# Patient Record
Sex: FEMALE | Race: BLACK OR AFRICAN AMERICAN | NOT HISPANIC OR LATINO | URBAN - METROPOLITAN AREA
[De-identification: names, ages, dates, MRNs, and addresses within clinical notes are randomized per-mention and may not be internally consistent; named-entity substitution may affect disease eponyms.]

---

## 2021-11-24 ENCOUNTER — OUTPATIENT (OUTPATIENT)
Dept: OUTPATIENT SERVICES | Facility: HOSPITAL | Age: 31
LOS: 1 days | End: 2021-11-24
Payer: COMMERCIAL

## 2021-11-24 VITALS
SYSTOLIC BLOOD PRESSURE: 107 MMHG | DIASTOLIC BLOOD PRESSURE: 62 MMHG | RESPIRATION RATE: 16 BRPM | HEIGHT: 66 IN | WEIGHT: 160.06 LBS | TEMPERATURE: 97 F | HEART RATE: 70 BPM | OXYGEN SATURATION: 97 %

## 2021-11-24 DIAGNOSIS — D25.9 LEIOMYOMA OF UTERUS, UNSPECIFIED: ICD-10-CM

## 2021-11-24 DIAGNOSIS — N63.0 UNSPECIFIED LUMP IN UNSPECIFIED BREAST: Chronic | ICD-10-CM

## 2021-11-24 DIAGNOSIS — N83.201 UNSPECIFIED OVARIAN CYST, RIGHT SIDE: ICD-10-CM

## 2021-11-24 DIAGNOSIS — Z90.721 ACQUIRED ABSENCE OF OVARIES, UNILATERAL: Chronic | ICD-10-CM

## 2021-11-24 DIAGNOSIS — N75.0 CYST OF BARTHOLIN'S GLAND: Chronic | ICD-10-CM

## 2021-11-24 DIAGNOSIS — Z01.818 ENCOUNTER FOR OTHER PREPROCEDURAL EXAMINATION: ICD-10-CM

## 2021-11-24 LAB
ANION GAP SERPL CALC-SCNC: 4 MMOL/L — LOW (ref 5–17)
BUN SERPL-MCNC: 16 MG/DL — SIGNIFICANT CHANGE UP (ref 7–23)
CALCIUM SERPL-MCNC: 8.7 MG/DL — SIGNIFICANT CHANGE UP (ref 8.5–10.1)
CHLORIDE SERPL-SCNC: 109 MMOL/L — HIGH (ref 96–108)
CO2 SERPL-SCNC: 27 MMOL/L — SIGNIFICANT CHANGE UP (ref 22–31)
CREAT SERPL-MCNC: 0.82 MG/DL — SIGNIFICANT CHANGE UP (ref 0.5–1.3)
GLUCOSE SERPL-MCNC: 121 MG/DL — HIGH (ref 70–99)
HCG SERPL-ACNC: <1 MIU/ML — SIGNIFICANT CHANGE UP
HCT VFR BLD CALC: 37.5 % — SIGNIFICANT CHANGE UP (ref 34.5–45)
HGB BLD-MCNC: 12.3 G/DL — SIGNIFICANT CHANGE UP (ref 11.5–15.5)
MCHC RBC-ENTMCNC: 22.2 PG — LOW (ref 27–34)
MCHC RBC-ENTMCNC: 32.8 GM/DL — SIGNIFICANT CHANGE UP (ref 32–36)
MCV RBC AUTO: 67.7 FL — LOW (ref 80–100)
NRBC # BLD: 0 /100 WBCS — SIGNIFICANT CHANGE UP (ref 0–0)
PLATELET # BLD AUTO: 258 K/UL — SIGNIFICANT CHANGE UP (ref 150–400)
POTASSIUM SERPL-MCNC: 3.9 MMOL/L — SIGNIFICANT CHANGE UP (ref 3.5–5.3)
POTASSIUM SERPL-SCNC: 3.9 MMOL/L — SIGNIFICANT CHANGE UP (ref 3.5–5.3)
RBC # BLD: 5.54 M/UL — HIGH (ref 3.8–5.2)
RBC # FLD: 14.9 % — HIGH (ref 10.3–14.5)
SODIUM SERPL-SCNC: 140 MMOL/L — SIGNIFICANT CHANGE UP (ref 135–145)
WBC # BLD: 3.08 K/UL — LOW (ref 3.8–10.5)
WBC # FLD AUTO: 3.08 K/UL — LOW (ref 3.8–10.5)

## 2021-11-24 PROCEDURE — G0463: CPT

## 2021-11-24 PROCEDURE — 84702 CHORIONIC GONADOTROPIN TEST: CPT

## 2021-11-24 PROCEDURE — 86900 BLOOD TYPING SEROLOGIC ABO: CPT

## 2021-11-24 PROCEDURE — 86901 BLOOD TYPING SEROLOGIC RH(D): CPT

## 2021-11-24 PROCEDURE — 86850 RBC ANTIBODY SCREEN: CPT

## 2021-11-24 PROCEDURE — 36415 COLL VENOUS BLD VENIPUNCTURE: CPT

## 2021-11-24 PROCEDURE — 80048 BASIC METABOLIC PNL TOTAL CA: CPT

## 2021-11-24 PROCEDURE — 85027 COMPLETE CBC AUTOMATED: CPT

## 2021-11-24 NOTE — H&P PST ADULT - NSANTHOSAYNRD_GEN_A_CORE
No. DINO screening performed.  STOP BANG Legend: 0-2 = LOW Risk; 3-4 = INTERMEDIATE Risk; 5-8 = HIGH Risk

## 2021-11-24 NOTE — H&P PST ADULT - ASSESSMENT
30 y/o female, scheduled fro D&C hysteroscopy, abdominal myomectomy  right ovarian cystectomy and possible  hysteroscopic resection of myoma on 12/8/21. Pre op testing today.   32 y/o female, scheduled fro D&C hysteroscopy, abdominal myomectomy  right ovarian cystectomy and possible  hysteroscopic resection of myoma on 12/8/21. Pre op testing today.  .     30 y/o female, scheduled fro D&C hysteroscopy, abdominal myomectomy  right ovarian cystectomy and possible  hysteroscopic resection of myoma on 12/8/21. Pre op testing today.

## 2021-11-24 NOTE — H&P PST ADULT - PROBLEM SELECTOR PLAN 1
scheduled fro D&C hysteroscopy, abdominal myomectomy  right ovarian cystectomy and possible  hysteroscopic resection of myoma on 12/8/21. Pre op testing today.  Pre op instructions:  Hold OTC supplements. Medications reviewed for the week and morning of surgery.  NPO after 11pm to the morning of surgery.   Vaccinated for covid: Pfizer   Shower 2 days before and the morning of surgery with antibacterial soap as instructed.  Covid testing 3 days prior to procedure, information given.  Patient verbalized understanding. scheduled fro D&C hysteroscopy, abdominal myomectomy  right ovarian cystectomy and possible  hysteroscopic resection of myoma on 12/8/21. Pre op testing today.  Pre op instructions:  Hold OTC supplements. Medications reviewed for the week and morning of surgery.  NPO after 11pm to the morning of surgery.   Vaccinated for covid: Pfizer   Shower 2 days before and the morning of surgery with antibacterial soap as instructed.  Covid testing 3 days prior to procedure, information given for Manuel, prescription given.  Patient verbalized understanding.

## 2021-11-24 NOTE — H&P PST ADULT - PROBLEM SELECTOR PLAN 2
scheduled fro D&C hysteroscopy, abdominal myomectomy  right ovarian cystectomy and possible  hysteroscopic resection of myoma on 12/8/21. Pre op testing today.    see above for instructions

## 2021-11-24 NOTE — H&P PST ADULT - HISTORY OF PRESENT ILLNESS
30 y/o healthy female with c/o of lower abdominal  pain since April 2021, Denies any irregular periods, however it is heavy. pain was during periods, now it is on and off for past couple of months. Seen by GYN Dr Medina, sonogram and MRI was done, diagnosed with uterine fibroid and right ovarian  cyst. Left ovary and cyst was removed in 2009. Now scheduled fro D&C hysteroscopy, abdominal myomectomy  right ovarian cystectomy and possible  hysteroscopic resection of myoma on 12/8/21. Pre op testing today.

## 2021-11-24 NOTE — H&P PST ADULT - NSICDXPASTSURGICALHX_GEN_ALL_CORE_FT
PAST SURGICAL HISTORY:  Bartholin cyst     Benign breast lumps right breast    History of left oophorectomy 2009

## 2021-11-24 NOTE — H&P PST ADULT - NSICDXFAMILYHX_GEN_ALL_CORE_FT
FAMILY HISTORY:  Mother  Still living? Yes, Estimated age: 51-60  FH: type 2 diabetes, Age at diagnosis: Age Unknown

## 2021-12-03 PROBLEM — H04.123 DRY EYE SYNDROME OF BILATERAL LACRIMAL GLANDS: Chronic | Status: ACTIVE | Noted: 2021-11-24

## 2021-12-03 PROBLEM — D57.3 SICKLE-CELL TRAIT: Chronic | Status: ACTIVE | Noted: 2021-11-24

## 2021-12-04 DIAGNOSIS — Z01.818 ENCOUNTER FOR OTHER PREPROCEDURAL EXAMINATION: ICD-10-CM

## 2021-12-04 PROBLEM — Z00.00 ENCOUNTER FOR PREVENTIVE HEALTH EXAMINATION: Status: ACTIVE | Noted: 2021-12-04

## 2021-12-05 ENCOUNTER — APPOINTMENT (OUTPATIENT)
Dept: DISASTER EMERGENCY | Facility: CLINIC | Age: 31
End: 2021-12-05

## 2021-12-06 LAB — SARS-COV-2 N GENE NPH QL NAA+PROBE: NOT DETECTED

## 2021-12-07 ENCOUNTER — TRANSCRIPTION ENCOUNTER (OUTPATIENT)
Age: 31
End: 2021-12-07

## 2021-12-08 ENCOUNTER — INPATIENT (INPATIENT)
Facility: HOSPITAL | Age: 31
LOS: 1 days | Discharge: HOME | DRG: 743 | End: 2021-12-10
Attending: OBSTETRICS & GYNECOLOGY | Admitting: OBSTETRICS & GYNECOLOGY
Payer: COMMERCIAL

## 2021-12-08 ENCOUNTER — RESULT REVIEW (OUTPATIENT)
Age: 31
End: 2021-12-08

## 2021-12-08 VITALS
HEART RATE: 93 BPM | DIASTOLIC BLOOD PRESSURE: 87 MMHG | TEMPERATURE: 98 F | WEIGHT: 160.06 LBS | RESPIRATION RATE: 18 BRPM | HEIGHT: 66 IN | SYSTOLIC BLOOD PRESSURE: 159 MMHG | OXYGEN SATURATION: 100 %

## 2021-12-08 DIAGNOSIS — N63.0 UNSPECIFIED LUMP IN UNSPECIFIED BREAST: Chronic | ICD-10-CM

## 2021-12-08 DIAGNOSIS — N83.201 UNSPECIFIED OVARIAN CYST, RIGHT SIDE: ICD-10-CM

## 2021-12-08 DIAGNOSIS — Z90.721 ACQUIRED ABSENCE OF OVARIES, UNILATERAL: Chronic | ICD-10-CM

## 2021-12-08 DIAGNOSIS — N75.0 CYST OF BARTHOLIN'S GLAND: Chronic | ICD-10-CM

## 2021-12-08 LAB — HCG UR QL: NEGATIVE — SIGNIFICANT CHANGE UP

## 2021-12-08 PROCEDURE — 88305 TISSUE EXAM BY PATHOLOGIST: CPT | Mod: 26

## 2021-12-08 RX ORDER — SODIUM CHLORIDE 9 MG/ML
1000 INJECTION, SOLUTION INTRAVENOUS
Refills: 0 | Status: DISCONTINUED | OUTPATIENT
Start: 2021-12-08 | End: 2021-12-08

## 2021-12-08 RX ORDER — ACETAMINOPHEN 500 MG
1000 TABLET ORAL ONCE
Refills: 0 | Status: COMPLETED | OUTPATIENT
Start: 2021-12-08 | End: 2021-12-08

## 2021-12-08 RX ORDER — ACETAMINOPHEN 500 MG
1000 TABLET ORAL ONCE
Refills: 0 | Status: COMPLETED | OUTPATIENT
Start: 2021-12-09 | End: 2021-12-09

## 2021-12-08 RX ORDER — HYDROMORPHONE HYDROCHLORIDE 2 MG/ML
1 INJECTION INTRAMUSCULAR; INTRAVENOUS; SUBCUTANEOUS
Refills: 0 | Status: DISCONTINUED | OUTPATIENT
Start: 2021-12-08 | End: 2021-12-08

## 2021-12-08 RX ORDER — MAGNESIUM HYDROXIDE 400 MG/1
30 TABLET, CHEWABLE ORAL
Refills: 0 | Status: DISCONTINUED | OUTPATIENT
Start: 2021-12-09 | End: 2021-12-10

## 2021-12-08 RX ORDER — MORPHINE SULFATE 50 MG/1
4 CAPSULE, EXTENDED RELEASE ORAL EVERY 4 HOURS
Refills: 0 | Status: DISCONTINUED | OUTPATIENT
Start: 2021-12-08 | End: 2021-12-09

## 2021-12-08 RX ORDER — CETIRIZINE HYDROCHLORIDE 10 MG/1
10 TABLET ORAL DAILY
Refills: 0 | Status: DISCONTINUED | OUTPATIENT
Start: 2021-12-08 | End: 2021-12-08

## 2021-12-08 RX ORDER — ENOXAPARIN SODIUM 100 MG/ML
40 INJECTION SUBCUTANEOUS DAILY
Refills: 0 | Status: DISCONTINUED | OUTPATIENT
Start: 2021-12-09 | End: 2021-12-10

## 2021-12-08 RX ORDER — ONDANSETRON 8 MG/1
4 TABLET, FILM COATED ORAL EVERY 6 HOURS
Refills: 0 | Status: DISCONTINUED | OUTPATIENT
Start: 2021-12-08 | End: 2021-12-10

## 2021-12-08 RX ORDER — OXYCODONE HYDROCHLORIDE 5 MG/1
10 TABLET ORAL EVERY 4 HOURS
Refills: 0 | Status: DISCONTINUED | OUTPATIENT
Start: 2021-12-08 | End: 2021-12-10

## 2021-12-08 RX ORDER — OXYCODONE HYDROCHLORIDE 5 MG/1
5 TABLET ORAL EVERY 4 HOURS
Refills: 0 | Status: DISCONTINUED | OUTPATIENT
Start: 2021-12-08 | End: 2021-12-10

## 2021-12-08 RX ORDER — LORATADINE 10 MG/1
10 TABLET ORAL DAILY
Refills: 0 | Status: DISCONTINUED | OUTPATIENT
Start: 2021-12-08 | End: 2021-12-10

## 2021-12-08 RX ORDER — CEFAZOLIN SODIUM 1 G
2000 VIAL (EA) INJECTION ONCE
Refills: 0 | Status: COMPLETED | OUTPATIENT
Start: 2021-12-08 | End: 2021-12-08

## 2021-12-08 RX ORDER — BUPIVACAINE 13.3 MG/ML
20 INJECTION, SUSPENSION, LIPOSOMAL INFILTRATION ONCE
Refills: 0 | Status: DISCONTINUED | OUTPATIENT
Start: 2021-12-08 | End: 2021-12-08

## 2021-12-08 RX ORDER — LORATADINE 10 MG/1
10 TABLET ORAL DAILY
Refills: 0 | Status: DISCONTINUED | OUTPATIENT
Start: 2021-12-08 | End: 2021-12-08

## 2021-12-08 RX ORDER — HYDROMORPHONE HYDROCHLORIDE 2 MG/ML
0.5 INJECTION INTRAMUSCULAR; INTRAVENOUS; SUBCUTANEOUS
Refills: 0 | Status: DISCONTINUED | OUTPATIENT
Start: 2021-12-08 | End: 2021-12-08

## 2021-12-08 RX ORDER — ONDANSETRON 8 MG/1
4 TABLET, FILM COATED ORAL ONCE
Refills: 0 | Status: DISCONTINUED | OUTPATIENT
Start: 2021-12-08 | End: 2021-12-08

## 2021-12-08 RX ORDER — KETOROLAC TROMETHAMINE 30 MG/ML
15 SYRINGE (ML) INJECTION EVERY 6 HOURS
Refills: 0 | Status: DISCONTINUED | OUTPATIENT
Start: 2021-12-08 | End: 2021-12-09

## 2021-12-08 RX ORDER — IBUPROFEN 200 MG
600 TABLET ORAL THREE TIMES A DAY
Refills: 0 | Status: DISCONTINUED | OUTPATIENT
Start: 2021-12-09 | End: 2021-12-10

## 2021-12-08 RX ADMIN — Medication 15 MILLIGRAM(S): at 21:20

## 2021-12-08 RX ADMIN — Medication 400 MILLIGRAM(S): at 16:46

## 2021-12-08 RX ADMIN — OXYCODONE HYDROCHLORIDE 10 MILLIGRAM(S): 5 TABLET ORAL at 14:32

## 2021-12-08 RX ADMIN — SODIUM CHLORIDE 75 MILLILITER(S): 9 INJECTION, SOLUTION INTRAVENOUS at 11:28

## 2021-12-08 RX ADMIN — HYDROMORPHONE HYDROCHLORIDE 1 MILLIGRAM(S): 2 INJECTION INTRAMUSCULAR; INTRAVENOUS; SUBCUTANEOUS at 11:45

## 2021-12-08 RX ADMIN — HYDROMORPHONE HYDROCHLORIDE 1 MILLIGRAM(S): 2 INJECTION INTRAMUSCULAR; INTRAVENOUS; SUBCUTANEOUS at 11:28

## 2021-12-08 RX ADMIN — OXYCODONE HYDROCHLORIDE 10 MILLIGRAM(S): 5 TABLET ORAL at 16:02

## 2021-12-08 RX ADMIN — HYDROMORPHONE HYDROCHLORIDE 0.5 MILLIGRAM(S): 2 INJECTION INTRAMUSCULAR; INTRAVENOUS; SUBCUTANEOUS at 12:16

## 2021-12-08 RX ADMIN — HYDROMORPHONE HYDROCHLORIDE 1 MILLIGRAM(S): 2 INJECTION INTRAMUSCULAR; INTRAVENOUS; SUBCUTANEOUS at 11:38

## 2021-12-08 RX ADMIN — Medication 1000 MILLIGRAM(S): at 17:04

## 2021-12-08 RX ADMIN — Medication 15 MILLIGRAM(S): at 20:58

## 2021-12-08 RX ADMIN — HYDROMORPHONE HYDROCHLORIDE 0.5 MILLIGRAM(S): 2 INJECTION INTRAMUSCULAR; INTRAVENOUS; SUBCUTANEOUS at 12:01

## 2021-12-08 RX ADMIN — SODIUM CHLORIDE 125 MILLILITER(S): 9 INJECTION, SOLUTION INTRAVENOUS at 07:02

## 2021-12-08 RX ADMIN — MORPHINE SULFATE 4 MILLIGRAM(S): 50 CAPSULE, EXTENDED RELEASE ORAL at 19:05

## 2021-12-08 RX ADMIN — ONDANSETRON 4 MILLIGRAM(S): 8 TABLET, FILM COATED ORAL at 17:33

## 2021-12-08 RX ADMIN — HYDROMORPHONE HYDROCHLORIDE 1 MILLIGRAM(S): 2 INJECTION INTRAMUSCULAR; INTRAVENOUS; SUBCUTANEOUS at 12:00

## 2021-12-08 RX ADMIN — Medication 15 MILLIGRAM(S): at 15:00

## 2021-12-08 RX ADMIN — Medication 15 MILLIGRAM(S): at 13:39

## 2021-12-08 NOTE — PATIENT PROFILE ADULT - FUNCTIONAL ASSESSMENT - BASIC MOBILITY 4.
Detail Level: Zone OT orders acknowledged. Per PT, pt declining any therapy services and is not willing to participate in OT evaluation at this time. OT will sign off at this time, will assess pt for OT services should function decline/pt agreeable to evaluation.   4 = No assist / stand by assistance

## 2021-12-08 NOTE — BRIEF OPERATIVE NOTE - NSICDXBRIEFPROCEDURE_GEN_ALL_CORE_FT
PROCEDURES:  Abdominal myomectomy 08-Dec-2021 10:32:30  Pasquale Medina  Hysteroscopy, with dilation and curettage 08-Dec-2021 10:32:44  Pasquale Medina  Open right ovarian cystectomy 08-Dec-2021 10:32:56  Pasquale Medina  Open lysis of adhesions of ovary 08-Dec-2021 10:33:26  Pasquale Medina

## 2021-12-08 NOTE — BRIEF OPERATIVE NOTE - OPERATION/FINDINGS
At hysteroscopy, no impinging myomas seen. At laparotomy, extensive filmy adhesions of small and large bowel to anterior abdominal wall, uterus, bladder lysed. Approximately 7 fibroids removed ranging from 1 to 4 cm. Myomas were subserosal, intramural, and deep intramural/cavity-adjacent. Cavity entered and defect repaired separately. Left tube and ovary absent consistent with patient's surgical history. Right ovary with 2 cm corpus luteum cyst, and 6 cm cystic structure encompassing the distal right tube. Right cystic structure excised completely. Right tube was normal caliber with healthy-appearing right fimbriae seen. would not allow labor if pregnant.

## 2021-12-08 NOTE — BRIEF OPERATIVE NOTE - NSICDXBRIEFPREOP_GEN_ALL_CORE_FT
PRE-OP DIAGNOSIS:  Right ovarian cyst 08-Dec-2021 10:34:01  Pasquale Medina  Fibroid uterus 08-Dec-2021 10:33:40  Pasquale Medina

## 2021-12-08 NOTE — PATIENT PROFILE ADULT - FALL HARM RISK - UNIVERSAL INTERVENTIONS
Bed in lowest position, wheels locked, appropriate side rails in place/Call bell, personal items and telephone in reach/Instruct patient to call for assistance before getting out of bed or chair/Non-slip footwear when patient is out of bed/[place] to call system/Physically safe environment - no spills, clutter or unnecessary equipment/Purposeful Proactive Rounding/Room/bathroom lighting operational, light cord in reach

## 2021-12-08 NOTE — BRIEF OPERATIVE NOTE - NSICDXBRIEFPOSTOP_GEN_ALL_CORE_FT
POST-OP DIAGNOSIS:  Right ovarian cyst 08-Dec-2021 10:34:27  Pasquale Medina  Fibroid uterus 08-Dec-2021 10:34:18  Pasquale Medina

## 2021-12-09 ENCOUNTER — TRANSCRIPTION ENCOUNTER (OUTPATIENT)
Age: 31
End: 2021-12-09

## 2021-12-09 LAB
ANION GAP SERPL CALC-SCNC: 5 MMOL/L — SIGNIFICANT CHANGE UP (ref 5–17)
BASOPHILS # BLD AUTO: 0.03 K/UL — SIGNIFICANT CHANGE UP (ref 0–0.2)
BASOPHILS NFR BLD AUTO: 0.4 % — SIGNIFICANT CHANGE UP (ref 0–2)
BUN SERPL-MCNC: 14 MG/DL — SIGNIFICANT CHANGE UP (ref 7–23)
CALCIUM SERPL-MCNC: 8.4 MG/DL — LOW (ref 8.5–10.1)
CHLORIDE SERPL-SCNC: 106 MMOL/L — SIGNIFICANT CHANGE UP (ref 96–108)
CO2 SERPL-SCNC: 28 MMOL/L — SIGNIFICANT CHANGE UP (ref 22–31)
CREAT SERPL-MCNC: 0.9 MG/DL — SIGNIFICANT CHANGE UP (ref 0.5–1.3)
EOSINOPHIL # BLD AUTO: 0.02 K/UL — SIGNIFICANT CHANGE UP (ref 0–0.5)
EOSINOPHIL NFR BLD AUTO: 0.2 % — SIGNIFICANT CHANGE UP (ref 0–6)
GLUCOSE SERPL-MCNC: 114 MG/DL — HIGH (ref 70–99)
HCT VFR BLD CALC: 33.8 % — LOW (ref 34.5–45)
HGB BLD-MCNC: 11.4 G/DL — LOW (ref 11.5–15.5)
IMM GRANULOCYTES NFR BLD AUTO: 0.4 % — SIGNIFICANT CHANGE UP (ref 0–1.5)
LYMPHOCYTES # BLD AUTO: 0.75 K/UL — LOW (ref 1–3.3)
LYMPHOCYTES # BLD AUTO: 9.1 % — LOW (ref 13–44)
MCHC RBC-ENTMCNC: 22.7 PG — LOW (ref 27–34)
MCHC RBC-ENTMCNC: 33.7 GM/DL — SIGNIFICANT CHANGE UP (ref 32–36)
MCV RBC AUTO: 67.2 FL — LOW (ref 80–100)
MONOCYTES # BLD AUTO: 0.91 K/UL — HIGH (ref 0–0.9)
MONOCYTES NFR BLD AUTO: 11.1 % — SIGNIFICANT CHANGE UP (ref 2–14)
NEUTROPHILS # BLD AUTO: 6.48 K/UL — SIGNIFICANT CHANGE UP (ref 1.8–7.4)
NEUTROPHILS NFR BLD AUTO: 78.8 % — HIGH (ref 43–77)
NRBC # BLD: 0 /100 WBCS — SIGNIFICANT CHANGE UP (ref 0–0)
PLATELET # BLD AUTO: 232 K/UL — SIGNIFICANT CHANGE UP (ref 150–400)
POTASSIUM SERPL-MCNC: 3.8 MMOL/L — SIGNIFICANT CHANGE UP (ref 3.5–5.3)
POTASSIUM SERPL-SCNC: 3.8 MMOL/L — SIGNIFICANT CHANGE UP (ref 3.5–5.3)
RBC # BLD: 5.03 M/UL — SIGNIFICANT CHANGE UP (ref 3.8–5.2)
RBC # FLD: 14.5 % — SIGNIFICANT CHANGE UP (ref 10.3–14.5)
SODIUM SERPL-SCNC: 139 MMOL/L — SIGNIFICANT CHANGE UP (ref 135–145)
WBC # BLD: 8.22 K/UL — SIGNIFICANT CHANGE UP (ref 3.8–10.5)
WBC # FLD AUTO: 8.22 K/UL — SIGNIFICANT CHANGE UP (ref 3.8–10.5)

## 2021-12-09 RX ORDER — SIMETHICONE 80 MG/1
80 TABLET, CHEWABLE ORAL
Refills: 0 | Status: DISCONTINUED | OUTPATIENT
Start: 2021-12-09 | End: 2021-12-10

## 2021-12-09 RX ORDER — SIMETHICONE 80 MG/1
80 TABLET, CHEWABLE ORAL ONCE
Refills: 0 | Status: COMPLETED | OUTPATIENT
Start: 2021-12-09 | End: 2021-12-09

## 2021-12-09 RX ADMIN — Medication 400 MILLIGRAM(S): at 00:17

## 2021-12-09 RX ADMIN — SIMETHICONE 80 MILLIGRAM(S): 80 TABLET, CHEWABLE ORAL at 14:50

## 2021-12-09 RX ADMIN — Medication 600 MILLIGRAM(S): at 13:07

## 2021-12-09 RX ADMIN — ENOXAPARIN SODIUM 40 MILLIGRAM(S): 100 INJECTION SUBCUTANEOUS at 13:07

## 2021-12-09 RX ADMIN — Medication 1000 MILLIGRAM(S): at 00:40

## 2021-12-09 RX ADMIN — Medication 1000 MILLIGRAM(S): at 09:07

## 2021-12-09 RX ADMIN — SIMETHICONE 80 MILLIGRAM(S): 80 TABLET, CHEWABLE ORAL at 23:14

## 2021-12-09 RX ADMIN — Medication 400 MILLIGRAM(S): at 09:03

## 2021-12-09 RX ADMIN — ONDANSETRON 4 MILLIGRAM(S): 8 TABLET, FILM COATED ORAL at 11:05

## 2021-12-09 RX ADMIN — MORPHINE SULFATE 4 MILLIGRAM(S): 50 CAPSULE, EXTENDED RELEASE ORAL at 06:55

## 2021-12-09 RX ADMIN — MAGNESIUM HYDROXIDE 30 MILLILITER(S): 400 TABLET, CHEWABLE ORAL at 18:23

## 2021-12-09 RX ADMIN — MAGNESIUM HYDROXIDE 30 MILLILITER(S): 400 TABLET, CHEWABLE ORAL at 05:32

## 2021-12-09 RX ADMIN — OXYCODONE HYDROCHLORIDE 5 MILLIGRAM(S): 5 TABLET ORAL at 23:14

## 2021-12-09 RX ADMIN — LORATADINE 10 MILLIGRAM(S): 10 TABLET ORAL at 13:07

## 2021-12-09 RX ADMIN — Medication 600 MILLIGRAM(S): at 13:10

## 2021-12-09 RX ADMIN — MORPHINE SULFATE 4 MILLIGRAM(S): 50 CAPSULE, EXTENDED RELEASE ORAL at 07:09

## 2021-12-09 NOTE — DISCHARGE NOTE PROVIDER - NSDCFUADDAPPT_GEN_ALL_CORE_FT
Call the office to schedule a post-operative appointment in 2 weeks.     Adrian Tarango: [phone]    FOR URGENT POSTOPERATIVE PROBLEMS, CALL PROSPER AT DR. DICKEY'S SURGICAL HOTLINE: [phone].

## 2021-12-09 NOTE — DISCHARGE NOTE PROVIDER - NSDCMRMEDTOKEN_GEN_ALL_CORE_FT
Aleve 220 mg oral tablet: 2 tab(s) orally every 12 hours, As Needed  eye drops: for dry eyes  ZyrTEC 10 mg oral tablet: 1 tab(s) orally once a day   ibuprofen 200 mg oral capsule: Starting the day after surgery, you SHOULD use 600 mg of ibuprofen (usually 3 over-the-counter tablets) THREE TIMES PER DAY for THREE DAYS....WHETHER YOU FEEL YOU NEED THEM OR NOT!.  IN ADDITION TO ibuprofen, you can use 1-2 Percocet (oxycodone) every 4-6 hours as needed for pain.   Percocet 5 mg-325 mg oral tablet: 1 tab(s) orally every 6 hours, As Needed, in addition to ibuprofen

## 2021-12-09 NOTE — DISCHARGE NOTE PROVIDER - NSDCFUADDINST_GEN_ALL_CORE_FT
No intercourse/douching/tampons for 6 weeks. Avoid strenuous activity, exercise, heavy lifting for two weeks. You may shower freely, but do not soak in the tub or swim. Eat according to your appetite. Please follow all written and verbal instructions, and call us if you are having any acute problems, including (but not limited to) excessive vaginal bleeding (soaking pads), fever >100 degrees, persistent nausea or vomiting, or failure to improve over time

## 2021-12-09 NOTE — DISCHARGE NOTE PROVIDER - NSDCCPTREATMENT_GEN_ALL_CORE_FT
PRINCIPAL PROCEDURE  Procedure: Abdominal myomectomy  Findings and Treatment:       SECONDARY PROCEDURE  Procedure: Open lysis of adhesions of ovary  Findings and Treatment:     Procedure: Open right ovarian cystectomy  Findings and Treatment:     Procedure: Hysteroscopy, with dilation and curettage  Findings and Treatment:

## 2021-12-09 NOTE — DISCHARGE NOTE PROVIDER - HOSPITAL COURSE
32 yo F with multiple cavity-distorting myomas on saline sono, referred by MESSI for myomectomy. At hysteroscopy, no resectable myomas were seen. At laparotomy, approximately 7 myomas ranging from 1-4 cm excised from subserosal, IM, and deep IM/cavity-adjacent locations. 5 cm right adnexal cyst encompassing distal right tube was excised; tube was normal caliber with healthy-appearing fimbriae. Cavity entered.  cc.

## 2021-12-09 NOTE — DISCHARGE NOTE PROVIDER - CARE PROVIDER_API CALL
Pasquale Medina)  Obstetrics and Gynecology  [address] [place] [address], [address]  [place] [address]  Phone: [phone]  Fax: [phone]  Follow Up Time:

## 2021-12-09 NOTE — DISCHARGE NOTE PROVIDER - NSDCCPCAREPLAN_GEN_ALL_CORE_FT
PRINCIPAL DISCHARGE DIAGNOSIS  Diagnosis: Uterine myoma  Assessment and Plan of Treatment:       SECONDARY DISCHARGE DIAGNOSES  Diagnosis: Right ovarian cyst  Assessment and Plan of Treatment:

## 2021-12-10 ENCOUNTER — TRANSCRIPTION ENCOUNTER (OUTPATIENT)
Age: 31
End: 2021-12-10

## 2021-12-10 VITALS
WEIGHT: 177.03 LBS | HEART RATE: 108 BPM | SYSTOLIC BLOOD PRESSURE: 103 MMHG | RESPIRATION RATE: 17 BRPM | OXYGEN SATURATION: 95 % | DIASTOLIC BLOOD PRESSURE: 68 MMHG | TEMPERATURE: 99 F

## 2021-12-10 PROCEDURE — 36415 COLL VENOUS BLD VENIPUNCTURE: CPT

## 2021-12-10 PROCEDURE — C9399: CPT

## 2021-12-10 PROCEDURE — 85025 COMPLETE CBC W/AUTO DIFF WBC: CPT

## 2021-12-10 PROCEDURE — 81025 URINE PREGNANCY TEST: CPT

## 2021-12-10 PROCEDURE — C1889: CPT

## 2021-12-10 PROCEDURE — 88305 TISSUE EXAM BY PATHOLOGIST: CPT

## 2021-12-10 PROCEDURE — 80048 BASIC METABOLIC PNL TOTAL CA: CPT

## 2021-12-10 RX ORDER — OXYCODONE AND ACETAMINOPHEN 5; 325 MG/1; MG/1
1 TABLET ORAL
Qty: 0 | Refills: 0 | DISCHARGE

## 2021-12-10 RX ORDER — CETIRIZINE HYDROCHLORIDE 10 MG/1
1 TABLET ORAL
Qty: 0 | Refills: 0 | DISCHARGE

## 2021-12-10 RX ORDER — IBUPROFEN 200 MG
3 TABLET ORAL
Qty: 0 | Refills: 0 | DISCHARGE

## 2021-12-10 RX ADMIN — Medication 600 MILLIGRAM(S): at 10:45

## 2021-12-10 RX ADMIN — Medication 600 MILLIGRAM(S): at 10:16

## 2021-12-10 RX ADMIN — Medication 600 MILLIGRAM(S): at 01:58

## 2021-12-10 NOTE — DISCHARGE NOTE NURSING/CASE MANAGEMENT/SOCIAL WORK - PATIENT PORTAL LINK FT
You can access the FollowMyHealth Patient Portal offered by [facility] by registering at the following website: [email or web address]. By joining [email or web address] portal, you will also be able to view your health information using other applications (apps) compatible with our system.

## 2021-12-10 NOTE — DISCHARGE NOTE NURSING/CASE MANAGEMENT/SOCIAL WORK - NSDCFUADDAPPT_GEN_ALL_CORE_FT
Call the office to schedule a post-operative appointment in 2 weeks.     Adrian Tarango: [phone]    FOR URGENT POSTOPERATIVE PROBLEMS, CALL PROSPER AT DR. DICKEY'S SURGICAL HOTLINE: [phone].

## 2021-12-10 NOTE — PROGRESS NOTE ADULT - SUBJECTIVE AND OBJECTIVE BOX
Pt seen and examined. The patient feels well. Denies any chest pain, palpitations or shortness of breath. Pt reports tolerating a clear liquid diet. She denies nausea and vomiting. Denies any lightheadedness/dizziness. Reports pain to abdominal incision region-4/10. Urine output adequate until 7pm  Vital Signs Last 24 Hrs  T(C): 36.8 (08 Dec 2021 20:07), Max: 36.8 (08 Dec 2021 13:15)  T(F): 98.2 (08 Dec 2021 20:07), Max: 98.3 (08 Dec 2021 13:15)  HR: 84 (08 Dec 2021 20:07) (81 - 103)  BP: 105/68 (08 Dec 2021 20:07) (105/68 - 130/77)  BP(mean): --  RR: 17 (08 Dec 2021 20:07) (12 - 18)  SpO2: 93% (08 Dec 2021 20:07) (91% - 100%)      12-08 @ 07:01  -  12-08 @ 21:52  --------------------------------------------------------  IN: 114 mL / OUT: 300 mL / NET: -186 mL      Exam: Sitting in chair in NAD. Abdominal binder in place.  Chest: CTA B/L, no rales, no ronchi.  CV: S1 & S2, RRR  Abdomen: Abdominal binder in place. Soft, appropriately tender. Incision to lower abdominal region- clean dry and intact. (+) BS to all 4 quadrants.  Calves soft bilaterally, denies tenderness to palpation.    A/P 31y # s/p Abdominal Myomectomy, Right ovarian cystectomy, D&C hysteroscopy, LANDY POD#0    1) Encourage incentive spirometry and OOB/ambulation.  2) Follow up AM labs  3) Advance diet to full liquids, then regular as tolerated in am, d/c IVF when wing. adequate oral intake  4) Continue analgesia with standing IV Tylenol, Toradol x 24h, and morphine for severe pain PRN, switch to oral analgesics POD#1  5) Continue mechanical DVT prophylaxis with SCD's & Lovenox POD#1  6) Encourage oral intake.          
POD #1    S: Patient feels well. Pain well controlled. Ambulated. Tolerated reg diet this am. Passed some flatus, no BM yet. Denies fevers, chills, nausea, vomiting, dizziness.    O:    Vital Signs Last 24 Hrs  T(C): 37.4 (09 Dec 2021 04:53), Max: 37.4 (09 Dec 2021 04:53)  T(F): 99.3 (09 Dec 2021 04:53), Max: 99.3 (09 Dec 2021 04:53)  HR: 91 (09 Dec 2021 04:53) (81 - 103)  BP: 106/69 (09 Dec 2021 04:53) (105/68 - 130/77)  BP(mean): --  RR: 17 (09 Dec 2021 04:53) (12 - 18)  SpO2: 93% (09 Dec 2021 04:53) (91% - 100%)  Gen: AAO x 3  Abd: Soft, mildly distended. Dressing removed, Incision closed,  no induration, no discharge, no erythema.   no cords/CT, SCDs on      Labs:            I&O's Summary    08 Dec 2021 07:01  -  09 Dec 2021 07:00  --------------------------------------------------------  IN: 114 mL / OUT: 2050 mL / NET: -1936 mL        MEDICATIONS  (STANDING):  acetaminophen   IVPB .. 1000 milliGRAM(s) IV Intermittent once  enoxaparin Injectable 40 milliGRAM(s) SubCutaneous daily  ibuprofen  Tablet. 600 milliGRAM(s) Oral three times a day  loratadine 10 milliGRAM(s) Oral daily  magnesium hydroxide Suspension 30 milliLiter(s) Oral two times a day    MEDICATIONS  (PRN):  ondansetron Injectable 4 milliGRAM(s) IV Push every 6 hours PRN Nausea and/or Vomiting  oxyCODONE    IR 5 milliGRAM(s) Oral every 4 hours PRN Moderate Pain (4 - 6)  oxyCODONE    IR 10 milliGRAM(s) Oral every 4 hours PRN Severe Pain (7 - 10)      A/P: 31y Female POD #1 s/p abdominal myomectomy  PO pain management with ibuprofen around the clock and percocet PRN  Encouraged ambulation  Discussed surgical findings/procedure. Re-emphasized need to avoid pregnancy x 6 months. Informed that she will need c/s if pregnant  Lovenox for VTE prophylaxis  Reg diet, but encouraged light PO intake  
POD #2    Feels well. Pain well controlled. Tolerating reg diet. +flatus, +BM. Feels she's ready for discharge    O:    Vital Signs Last 24 Hrs  T(C): 37.2 (10 Dec 2021 04:28), Max: 37.8 (09 Dec 2021 21:00)  T(F): 98.9 (10 Dec 2021 04:28), Max: 100 (09 Dec 2021 21:00)  HR: 108 (10 Dec 2021 04:28) (106 - 116)  BP: 103/68 (10 Dec 2021 04:28) (103/68 - 109/74)  BP(mean): --  RR: 17 (10 Dec 2021 04:28) (17 - 18)  SpO2: 95% (10 Dec 2021 04:28) (92% - 95%)  Gen: AAO x 3  Abd: Soft, mildly distended. ** bowel sounds. Incision closed,  no induration, no discharge, no erythema.     Labs:                          11.4   8.22  )-----------( 232      ( 09 Dec 2021 09:00 )             33.8     12-09    139  |  106  |  14  ----------------------------<  114<H>  3.8   |  28  |  0.90    Ca    8.4<L>      09 Dec 2021 09:00      I&O's Summary    09 Dec 2021 07:01  -  10 Dec 2021 07:00  --------------------------------------------------------  IN: 0 mL / OUT: 700 mL / NET: -700 mL    MEDICATIONS  (STANDING):  enoxaparin Injectable 40 milliGRAM(s) SubCutaneous daily  ibuprofen  Tablet. 600 milliGRAM(s) Oral three times a day  loratadine 10 milliGRAM(s) Oral daily  magnesium hydroxide Suspension 30 milliLiter(s) Oral two times a day    MEDICATIONS  (PRN):  ondansetron Injectable 4 milliGRAM(s) IV Push every 6 hours PRN Nausea and/or Vomiting  oxyCODONE    IR 5 milliGRAM(s) Oral every 4 hours PRN Moderate Pain (4 - 6)  oxyCODONE    IR 10 milliGRAM(s) Oral every 4 hours PRN Severe Pain (7 - 10)  simethicone 80 milliGRAM(s) Chew two times a day PRN Gas      A/P: 31y Female POD #2  Doing well, stable for discharge  Discharge instructions and warning signs reviewed  PO Analgesia, avoid narcotics if possible  Reemphasized need to avoid preg x at least 6 months  RTO 2 weeks  Emergency contact info given

## 2024-02-18 ENCOUNTER — NON-APPOINTMENT (OUTPATIENT)
Age: 34
End: 2024-02-18

## 2024-12-15 ENCOUNTER — EMERGENCY (EMERGENCY)
Facility: HOSPITAL | Age: 34
LOS: 1 days | Discharge: HOME | End: 2024-12-15
Attending: EMERGENCY MEDICINE | Admitting: EMERGENCY MEDICINE
Payer: COMMERCIAL

## 2024-12-15 VITALS
HEART RATE: 80 BPM | WEIGHT: 154.98 LBS | RESPIRATION RATE: 16 BRPM | OXYGEN SATURATION: 100 % | SYSTOLIC BLOOD PRESSURE: 123 MMHG | TEMPERATURE: 98 F | DIASTOLIC BLOOD PRESSURE: 81 MMHG

## 2024-12-15 VITALS
OXYGEN SATURATION: 100 % | HEART RATE: 88 BPM | SYSTOLIC BLOOD PRESSURE: 111 MMHG | RESPIRATION RATE: 15 BRPM | DIASTOLIC BLOOD PRESSURE: 77 MMHG

## 2024-12-15 DIAGNOSIS — N63.0 UNSPECIFIED LUMP IN UNSPECIFIED BREAST: Chronic | ICD-10-CM

## 2024-12-15 DIAGNOSIS — N75.0 CYST OF BARTHOLIN'S GLAND: Chronic | ICD-10-CM

## 2024-12-15 DIAGNOSIS — Z90.721 ACQUIRED ABSENCE OF OVARIES, UNILATERAL: Chronic | ICD-10-CM

## 2024-12-15 LAB
ALBUMIN SERPL ELPH-MCNC: 4.2 G/DL — SIGNIFICANT CHANGE UP (ref 3.3–5)
ALP SERPL-CCNC: 65 U/L — SIGNIFICANT CHANGE UP (ref 40–120)
ALT FLD-CCNC: 17 U/L — SIGNIFICANT CHANGE UP (ref 4–33)
ANION GAP SERPL CALC-SCNC: 16 MMOL/L — HIGH (ref 7–14)
ANISOCYTOSIS BLD QL: SLIGHT — SIGNIFICANT CHANGE UP
APPEARANCE UR: ABNORMAL
APTT BLD: 23.1 SEC — LOW (ref 24.5–35.6)
AST SERPL-CCNC: 19 U/L — SIGNIFICANT CHANGE UP (ref 4–32)
BACTERIA # UR AUTO: ABNORMAL /HPF
BASOPHILS # BLD AUTO: 0.14 K/UL — SIGNIFICANT CHANGE UP (ref 0–0.2)
BASOPHILS NFR BLD AUTO: 1.7 % — SIGNIFICANT CHANGE UP (ref 0–2)
BILIRUB SERPL-MCNC: <0.2 MG/DL — SIGNIFICANT CHANGE UP (ref 0.2–1.2)
BILIRUB UR-MCNC: NEGATIVE — SIGNIFICANT CHANGE UP
BUN SERPL-MCNC: 21 MG/DL — SIGNIFICANT CHANGE UP (ref 7–23)
CALCIUM SERPL-MCNC: 9.8 MG/DL — SIGNIFICANT CHANGE UP (ref 8.4–10.5)
CAST: 2 /LPF — SIGNIFICANT CHANGE UP (ref 0–4)
CHLORIDE SERPL-SCNC: 102 MMOL/L — SIGNIFICANT CHANGE UP (ref 98–107)
CO2 SERPL-SCNC: 21 MMOL/L — LOW (ref 22–31)
COLOR SPEC: ABNORMAL
CREAT SERPL-MCNC: 0.91 MG/DL — SIGNIFICANT CHANGE UP (ref 0.5–1.3)
DIFF PNL FLD: ABNORMAL
EGFR: 84 ML/MIN/1.73M2 — SIGNIFICANT CHANGE UP
EOSINOPHIL # BLD AUTO: 0.08 K/UL — SIGNIFICANT CHANGE UP (ref 0–0.5)
EOSINOPHIL NFR BLD AUTO: 0.9 % — SIGNIFICANT CHANGE UP (ref 0–6)
GAS PNL BLDV: SIGNIFICANT CHANGE UP
GIANT PLATELETS BLD QL SMEAR: PRESENT — SIGNIFICANT CHANGE UP
GLUCOSE SERPL-MCNC: 158 MG/DL — HIGH (ref 70–99)
GLUCOSE UR QL: NEGATIVE MG/DL — SIGNIFICANT CHANGE UP
HCG SERPL-ACNC: <1 MIU/ML — SIGNIFICANT CHANGE UP
HCT VFR BLD CALC: 38.4 % — SIGNIFICANT CHANGE UP (ref 34.5–45)
HGB BLD-MCNC: 12.2 G/DL — SIGNIFICANT CHANGE UP (ref 11.5–15.5)
IANC: 4.16 K/UL — SIGNIFICANT CHANGE UP (ref 1.8–7.4)
INR BLD: 0.9 RATIO — SIGNIFICANT CHANGE UP (ref 0.85–1.16)
KETONES UR-MCNC: NEGATIVE MG/DL — SIGNIFICANT CHANGE UP
LEUKOCYTE ESTERASE UR-ACNC: ABNORMAL
LYMPHOCYTES # BLD AUTO: 2.35 K/UL — SIGNIFICANT CHANGE UP (ref 1–3.3)
LYMPHOCYTES # BLD AUTO: 28.1 % — SIGNIFICANT CHANGE UP (ref 13–44)
MAGNESIUM SERPL-MCNC: 2 MG/DL — SIGNIFICANT CHANGE UP (ref 1.6–2.6)
MANUAL SMEAR VERIFICATION: SIGNIFICANT CHANGE UP
MCHC RBC-ENTMCNC: 22 PG — LOW (ref 27–34)
MCHC RBC-ENTMCNC: 31.8 G/DL — LOW (ref 32–36)
MCV RBC AUTO: 69.3 FL — LOW (ref 80–100)
MICROCYTES BLD QL: SLIGHT — SIGNIFICANT CHANGE UP
MONOCYTES # BLD AUTO: 0.88 K/UL — SIGNIFICANT CHANGE UP (ref 0–0.9)
MONOCYTES NFR BLD AUTO: 10.5 % — SIGNIFICANT CHANGE UP (ref 2–14)
NEUTROPHILS # BLD AUTO: 4.62 K/UL — SIGNIFICANT CHANGE UP (ref 1.8–7.4)
NEUTROPHILS NFR BLD AUTO: 55.3 % — SIGNIFICANT CHANGE UP (ref 43–77)
NITRITE UR-MCNC: NEGATIVE — SIGNIFICANT CHANGE UP
OVALOCYTES BLD QL SMEAR: SLIGHT — SIGNIFICANT CHANGE UP
PH UR: 5.5 — SIGNIFICANT CHANGE UP (ref 5–8)
PHOSPHATE SERPL-MCNC: 4.5 MG/DL — SIGNIFICANT CHANGE UP (ref 2.5–4.5)
PLAT MORPH BLD: NORMAL — SIGNIFICANT CHANGE UP
PLATELET # BLD AUTO: 290 K/UL — SIGNIFICANT CHANGE UP (ref 150–400)
PLATELET COUNT - ESTIMATE: NORMAL — SIGNIFICANT CHANGE UP
POIKILOCYTOSIS BLD QL AUTO: SLIGHT — SIGNIFICANT CHANGE UP
POTASSIUM SERPL-MCNC: 3.6 MMOL/L — SIGNIFICANT CHANGE UP (ref 3.5–5.3)
POTASSIUM SERPL-SCNC: 3.6 MMOL/L — SIGNIFICANT CHANGE UP (ref 3.5–5.3)
PROT SERPL-MCNC: 7.2 G/DL — SIGNIFICANT CHANGE UP (ref 6–8.3)
PROT UR-MCNC: 100 MG/DL
PROTHROM AB SERPL-ACNC: 10.5 SEC — SIGNIFICANT CHANGE UP (ref 9.9–13.4)
RBC # BLD: 5.54 M/UL — HIGH (ref 3.8–5.2)
RBC # FLD: 14.4 % — SIGNIFICANT CHANGE UP (ref 10.3–14.5)
RBC BLD AUTO: ABNORMAL
RBC CASTS # UR COMP ASSIST: 436 /HPF — HIGH (ref 0–4)
SMUDGE CELLS # BLD: PRESENT — SIGNIFICANT CHANGE UP
SODIUM SERPL-SCNC: 139 MMOL/L — SIGNIFICANT CHANGE UP (ref 135–145)
SP GR SPEC: 1.02 — SIGNIFICANT CHANGE UP (ref 1–1.03)
SQUAMOUS # UR AUTO: 19 /HPF — HIGH (ref 0–5)
UROBILINOGEN FLD QL: 1 MG/DL — SIGNIFICANT CHANGE UP (ref 0.2–1)
VARIANT LYMPHS # BLD: 3.5 % — SIGNIFICANT CHANGE UP (ref 0–6)
WBC # BLD: 8.35 K/UL — SIGNIFICANT CHANGE UP (ref 3.8–10.5)
WBC # FLD AUTO: 8.35 K/UL — SIGNIFICANT CHANGE UP (ref 3.8–10.5)
WBC UR QL: 4 /HPF — SIGNIFICANT CHANGE UP (ref 0–5)

## 2024-12-15 PROCEDURE — 99285 EMERGENCY DEPT VISIT HI MDM: CPT

## 2024-12-15 PROCEDURE — 76830 TRANSVAGINAL US NON-OB: CPT | Mod: 26

## 2024-12-15 PROCEDURE — 74177 CT ABD & PELVIS W/CONTRAST: CPT | Mod: 26,MC

## 2024-12-15 RX ORDER — TAMSULOSIN HYDROCHLORIDE 0.4 MG/1
1 CAPSULE ORAL
Qty: 7 | Refills: 0
Start: 2024-12-15 | End: 2024-12-21

## 2024-12-15 RX ORDER — KETOROLAC TROMETHAMINE 30 MG/ML
15 INJECTION INTRAMUSCULAR; INTRAVENOUS ONCE
Refills: 0 | Status: DISCONTINUED | OUTPATIENT
Start: 2024-12-15 | End: 2024-12-15

## 2024-12-15 RX ORDER — TAMSULOSIN HYDROCHLORIDE 0.4 MG/1
0.4 CAPSULE ORAL ONCE
Refills: 0 | Status: COMPLETED | OUTPATIENT
Start: 2024-12-15 | End: 2024-12-15

## 2024-12-15 RX ORDER — ONDANSETRON HYDROCHLORIDE 4 MG/1
4 TABLET, FILM COATED ORAL ONCE
Refills: 0 | Status: COMPLETED | OUTPATIENT
Start: 2024-12-15 | End: 2024-12-15

## 2024-12-15 RX ORDER — IBUPROFEN 200 MG
1 TABLET ORAL
Qty: 15 | Refills: 0
Start: 2024-12-15 | End: 2024-12-21

## 2024-12-15 RX ORDER — SODIUM CHLORIDE 9 MG/ML
1000 INJECTION, SOLUTION INTRAMUSCULAR; INTRAVENOUS; SUBCUTANEOUS ONCE
Refills: 0 | Status: COMPLETED | OUTPATIENT
Start: 2024-12-15 | End: 2024-12-15

## 2024-12-15 RX ADMIN — Medication 4 MILLIGRAM(S): at 10:47

## 2024-12-15 RX ADMIN — KETOROLAC TROMETHAMINE 15 MILLIGRAM(S): 30 INJECTION INTRAMUSCULAR; INTRAVENOUS at 06:09

## 2024-12-15 RX ADMIN — SODIUM CHLORIDE 1000 MILLILITER(S): 9 INJECTION, SOLUTION INTRAMUSCULAR; INTRAVENOUS; SUBCUTANEOUS at 10:48

## 2024-12-15 RX ADMIN — SODIUM CHLORIDE 1000 MILLILITER(S): 9 INJECTION, SOLUTION INTRAMUSCULAR; INTRAVENOUS; SUBCUTANEOUS at 06:10

## 2024-12-15 RX ADMIN — TAMSULOSIN HYDROCHLORIDE 0.4 MILLIGRAM(S): 0.4 CAPSULE ORAL at 10:47

## 2024-12-15 RX ADMIN — ONDANSETRON HYDROCHLORIDE 4 MILLIGRAM(S): 4 TABLET, FILM COATED ORAL at 06:09

## 2024-12-15 NOTE — ED PROVIDER NOTE - CARE PLAN
1 Principal Discharge DX:	Left flank pain   Principal Discharge DX:	Kidney stone  Secondary Diagnosis:	Ovarian cyst

## 2024-12-15 NOTE — ED ADULT NURSE NOTE - NSFALLUNIVINTERV_ED_ALL_ED
Bed/Stretcher in lowest position, wheels locked, appropriate side rails in place/Call bell, personal items and telephone in reach/Instruct patient to call for assistance before getting out of bed/chair/stretcher/Non-slip footwear applied when patient is off stretcher/[place] to call system/Physically safe environment - no spills, clutter or unnecessary equipment/Purposeful proactive rounding/Room/bathroom lighting operational, light cord in reach

## 2024-12-15 NOTE — ED PROVIDER NOTE - PATIENT PORTAL LINK FT
You can access the FollowMyHealth Patient Portal offered by [facility] by registering at the following website: [email or web address]. By joining [facility]’s FollowMyHealth portal, you will also be able to view your health information using other applications (apps) compatible with our system.

## 2024-12-15 NOTE — ED ADULT NURSE NOTE - OBJECTIVE STATEMENT
Pt received to room 20 a&ox4, ambulatory, on room air coming to ED c/o abdominal pain. Pt endorses sudden onset left flank/left abdominal pain starting at 3 am. Pt endorsing nausea and vomiting. Pt respirations even and unlabored, chest rise and fall equal b/l. Pt denies chest pain, HA, SOB, dizziness, fever/chills. #20g placed to RAC, labs collected and sent. Pt pending US. Stretcher in lowest position, call bell within reach, pt safety maintained.

## 2024-12-15 NOTE — ED PROVIDER NOTE - OBJECTIVE STATEMENT
33 y/o F with PMHx ovarian cysts and fibroids presenting today with left flank pain that radiates to her left groin with associated vomiting and diarrhea. Pt states this abdominal pain woke her up from sleep around 3 am today. Pt denies fever, CP, SOB, dysuria, vaginal bleeding, vaginal discharge, hx of STI, hx of ectopic pregnancy. Pt is expecting her period this week, LMP ?

## 2024-12-15 NOTE — ED PROVIDER NOTE - ATTENDING CONTRIBUTION TO CARE
34-year-old female with history of ovarian cyst and fibroids status post surgery that presents with left flank pain that woke her up from sleep sudden onset 10 /10 radiating from the left flank to the left groin.  No pain medication taken.  She states that she feels urgency to have bowel movement and urination and nausea but no vomiting.  Denies any trauma or rashes prior to onset.  Was in her usual state of health prior to onset of pain.  Never had this before denies any recent surgeries or travel.  No diarrhea, fevers, chills, recent urinary symptoms.  Non-smoker no drinking no drugs.  LMP was approximately 1 month ago.    Patient is uncomfortable appearing writhing in pain unable to sit still.  Positive CVA tenderness on the left abdomen is otherwise soft nontender negative Goldman's no McBurney's abdomen and skin shows no signs of trauma or cirrhosis.  Heart is tachycardic but regular rhythm lungs are clear bilaterally no pitting edema bilateral lower extremities gait is steady and stable    34-year-old female history of ovarian cyst and fibroids that presents with sudden onset left flank pain rating to the groin which is concerning for likely kidney stone but also consider ovarian torsion although less likely given location of pain.  At this time will need to control pain with Toradol and Zofran given nausea but no vomiting.  Will send urinalysis but also in the meantime get transvaginal ultrasound to rule out torsion or ruptured cyst but also to get CT imaging to rule out kidney stones.  Will reassess after initial workup in the ED.  Patient has no risk factors for dissection or any other abdomen josefina aortic pathology unlikely splenic rupture and unlikely kidney rupture however CT will be obtained to rule out these possibilities.

## 2024-12-15 NOTE — ED PROVIDER NOTE - PHYSICAL EXAMINATION
GENERAL: visibly uncomfortable, crying, unable to stay in one position  HEENT: normal conjunctiva, oral mucosa moist  CARDIAC: regular rate and regular rhythm  PULM: clear to ascultation bilaterally, no appreciable crackles, rales, rhonchi, or wheezing, no increased work of breathing  GI: abdomen nondistended, soft, nontender, no rebound or guarding.   : no CVA tenderness, no suprapubic tenderness  SKIN: diaphoretic

## 2024-12-15 NOTE — ED PROVIDER NOTE - NSFOLLOWUPINSTRUCTIONS_ED_ALL_ED_FT
Follow up with your OB/GYN regarding the ultrasound findings we discussed.  Adequate hydration.   Follow up with primary care doctor and urologist.   Return to Emergency room with any worsening symptoms or no improvements.

## 2024-12-15 NOTE — ED ADULT NURSE REASSESSMENT NOTE - NS ED NURSE REASSESS COMMENT FT1
Received patient from Yelena Rivera RN. Patient has no c/o pain, A/O x 4, NAD, RR even and unlabored,  patient pending results at this time, resting comfortably, spouse at bedside, safety maintained.

## 2024-12-15 NOTE — ED ADULT TRIAGE NOTE - CHIEF COMPLAINT QUOTE
C/o sudden onset L sided flank and L sided abdominal pain that started at 3am associated with nausea, vomiting, diarrhea. Denies medical history. Pt in fetal position, moaning and crying in triage,

## 2024-12-15 NOTE — ED PROVIDER NOTE - CLINICAL SUMMARY MEDICAL DECISION MAKING FREE TEXT BOX
33 y/o F with PMHx ovarian cysts and fibroids presenting today with left flank pain that radiates to her left groin with associated vomiting and diarrhea. Concern for nephrolithiasis, ovarian torsion, diveriticulitis/osis, ectopic pregnacy. Will obtain Ct abd/pelvis, transvaginal US, UA, cbc, cmp, coags. Will give IVF, Toradol and zofran. Dispo pending labs and imaging.

## 2024-12-15 NOTE — ED PROVIDER NOTE - PROGRESS NOTE DETAILS
MARIE: Thus far patient's labs shows that she has trace leuk esterase with 436 red blood cells.  She has minimal elevated lactic acidosis at 2.3.  Creatinine is 0.91.  No leukocytosis.  At this time still diagnosis is kidney stones given her hematuria.  Still pending ultrasound read and CT imaging reviewed.  Pain is well-controlled at this time however. Pt reassessed at bedside, feels well, pain controlled. Informed of workup in ED, reviewed lab and/or radiology results (when applicable) with patient/caregiver. Informed pt of plan for discharge with instructions to follow up with PMD. Pt/caregiver expressed understanding of plan and agrees with plan for discharge. Strict return precautions discussed with patient in layman's terms, patient demonstrated understanding of return precautions. Jenny Matthew PA-C

## 2024-12-15 NOTE — ED ADULT TRIAGE NOTE - MODE OF ARRIVAL
Dupixent Pregnancy And Lactation Text: This medication likely crosses the placenta but the risk for the fetus is uncertain. This medication is excreted in breast milk. Private Auto Walk in

## 2024-12-18 ENCOUNTER — APPOINTMENT (OUTPATIENT)
Dept: UROLOGY | Facility: CLINIC | Age: 34
End: 2024-12-18
Payer: COMMERCIAL

## 2024-12-18 VITALS
HEART RATE: 77 BPM | DIASTOLIC BLOOD PRESSURE: 74 MMHG | OXYGEN SATURATION: 98 % | TEMPERATURE: 98 F | RESPIRATION RATE: 16 BRPM | SYSTOLIC BLOOD PRESSURE: 114 MMHG

## 2024-12-18 DIAGNOSIS — N20.1 CALCULUS OF URETER: ICD-10-CM

## 2024-12-18 PROCEDURE — 99204 OFFICE O/P NEW MOD 45 MIN: CPT

## 2024-12-18 RX ORDER — TAMSULOSIN HYDROCHLORIDE 0.4 MG/1
0.4 CAPSULE ORAL
Qty: 30 | Refills: 0 | Status: ACTIVE | COMMUNITY
Start: 2024-12-18 | End: 1900-01-01

## 2025-01-15 ENCOUNTER — APPOINTMENT (OUTPATIENT)
Dept: UROLOGY | Facility: CLINIC | Age: 35
End: 2025-01-15
Payer: COMMERCIAL

## 2025-01-15 DIAGNOSIS — N20.1 CALCULUS OF URETER: ICD-10-CM

## 2025-01-15 PROCEDURE — 76775 US EXAM ABDO BACK WALL LIM: CPT

## 2025-01-15 PROCEDURE — 99213 OFFICE O/P EST LOW 20 MIN: CPT

## 2025-01-25 ENCOUNTER — OUTPATIENT (OUTPATIENT)
Dept: OUTPATIENT SERVICES | Facility: HOSPITAL | Age: 35
LOS: 1 days | End: 2025-01-25

## 2025-01-25 DIAGNOSIS — N75.0 CYST OF BARTHOLIN'S GLAND: Chronic | ICD-10-CM

## 2025-01-25 DIAGNOSIS — N63.0 UNSPECIFIED LUMP IN UNSPECIFIED BREAST: Chronic | ICD-10-CM

## 2025-01-25 DIAGNOSIS — N20.1 CALCULUS OF URETER: ICD-10-CM

## 2025-01-25 DIAGNOSIS — Z90.721 ACQUIRED ABSENCE OF OVARIES, UNILATERAL: Chronic | ICD-10-CM

## 2025-02-01 ENCOUNTER — OUTPATIENT (OUTPATIENT)
Dept: OUTPATIENT SERVICES | Facility: HOSPITAL | Age: 35
LOS: 1 days | End: 2025-02-01
Payer: COMMERCIAL

## 2025-02-01 ENCOUNTER — RESULT REVIEW (OUTPATIENT)
Age: 35
End: 2025-02-01

## 2025-02-01 ENCOUNTER — APPOINTMENT (OUTPATIENT)
Dept: CT IMAGING | Facility: IMAGING CENTER | Age: 35
End: 2025-02-01
Payer: COMMERCIAL

## 2025-02-01 DIAGNOSIS — N20.1 CALCULUS OF URETER: ICD-10-CM

## 2025-02-01 DIAGNOSIS — Z90.721 ACQUIRED ABSENCE OF OVARIES, UNILATERAL: Chronic | ICD-10-CM

## 2025-02-01 DIAGNOSIS — N63.0 UNSPECIFIED LUMP IN UNSPECIFIED BREAST: Chronic | ICD-10-CM

## 2025-02-01 DIAGNOSIS — N75.0 CYST OF BARTHOLIN'S GLAND: Chronic | ICD-10-CM

## 2025-02-01 DIAGNOSIS — Z00.8 ENCOUNTER FOR OTHER GENERAL EXAMINATION: ICD-10-CM

## 2025-02-01 PROCEDURE — 74176 CT ABD & PELVIS W/O CONTRAST: CPT | Mod: 26

## 2025-02-01 PROCEDURE — 74176 CT ABD & PELVIS W/O CONTRAST: CPT
